# Patient Record
Sex: FEMALE | Race: WHITE
[De-identification: names, ages, dates, MRNs, and addresses within clinical notes are randomized per-mention and may not be internally consistent; named-entity substitution may affect disease eponyms.]

---

## 2017-11-27 ENCOUNTER — HOSPITAL ENCOUNTER (OUTPATIENT)
Dept: HOSPITAL 58 - SURG | Age: 67
Discharge: HOME | End: 2017-11-27
Attending: INTERNAL MEDICINE

## 2017-11-27 VITALS — DIASTOLIC BLOOD PRESSURE: 83 MMHG | SYSTOLIC BLOOD PRESSURE: 176 MMHG | TEMPERATURE: 97.5 F

## 2017-11-27 DIAGNOSIS — Z86.010: ICD-10-CM

## 2017-11-27 DIAGNOSIS — D12.3: ICD-10-CM

## 2017-11-27 DIAGNOSIS — D12.5: ICD-10-CM

## 2017-11-27 DIAGNOSIS — K57.30: ICD-10-CM

## 2017-11-27 DIAGNOSIS — Z98.0: ICD-10-CM

## 2017-11-27 DIAGNOSIS — Z09: Primary | ICD-10-CM

## 2017-11-28 NOTE — OP
PROCEDURE:  COLONOSCOPY TO CECUM WITH SNARE POLYPECTOMY.



ENDOSCOPIST:  RENY TOWNSEND M.D. 



INDICATION:  HISTORY OF POLYPS



INSTRUMENT:  P[employer]ant Power-190.



MEDICATION:  PER ANESTHESIA.



PROCEDURE:  The patient was positioned for colonoscopy.  The digital rectal 
exam was negative.  The colonoscope was inserted through the anus and advanced 
to the cecum. 



Anastomosis noted in the left abdomen proximally 40cm from the anal rectal 
junction. 



On withdraw the hepatic flexure we found a 1.5cm sessile polyp removed using 
piecemeal polypectomy with cautery. This has a hyperplastic appearance. 



A similar sessile polyp at 40cm removed using snare cautery. 



A small adenomatous appearing polyp at 20cm removed using snare cautery. 



Diverticuli were noted throughout the left colon. The retroflex exam was 
otherwise normal. Withdraw 16 minutes and 9 seconds. 



PLAN:  

1. Given the piecemeal resections and history of multiple large polyps suggest 
repeat colonoscopy in one year. 



CC: Dr. Michael SCHNEIDER

## 2018-10-10 ENCOUNTER — HOSPITAL ENCOUNTER (OUTPATIENT)
Dept: HOSPITAL 58 - RAD | Age: 68
Discharge: HOME | End: 2018-10-10
Attending: INTERNAL MEDICINE
Payer: COMMERCIAL

## 2018-10-10 DIAGNOSIS — Z12.31: Primary | ICD-10-CM

## 2018-10-10 PROCEDURE — 77067 SCR MAMMO BI INCL CAD: CPT

## 2018-10-12 NOTE — MAMMO
EXAM:  Bilateral digital screening mammogram (2-D and 3-D) 

  

History:  Screening 

  

Comparison:  Bilateral mammogram 10/09/2015 

  

Findings:  MLO and CC views of bilateral breasts demonstrate scattered fibroglandular breast parenchy
ma.  CAD was reviewed by the radiologist.  Tomosynthesis was performed.  Surgical clips seen within t
he right axilla.  Stable benign bilateral breast calcifications.  There are no dominant masses, no bolanos
spicious microcalcifications and no architectural distortions 

  

Impression:  Benign stable mammogram.  Recommend followup routine screening mammography in 1 year. 

  

BIRADS 2

## 2019-03-01 ENCOUNTER — HOSPITAL ENCOUNTER (OUTPATIENT)
Dept: HOSPITAL 58 - RAD | Age: 69
Discharge: HOME | End: 2019-03-01
Attending: INTERNAL MEDICINE
Payer: COMMERCIAL

## 2019-03-01 DIAGNOSIS — R00.2: Primary | ICD-10-CM

## 2019-03-01 DIAGNOSIS — R42: ICD-10-CM

## 2019-03-01 DIAGNOSIS — I65.29: ICD-10-CM

## 2019-03-01 PROCEDURE — 93227 XTRNL ECG REC<48 HR R&I: CPT

## 2019-03-01 NOTE — US
EXAM:  Bilateral carotid artery Doppler 

  

History:  Dizziness. 

  

Comparison:  Carotid Doppler 06/09/2016 

  

Technique:  Multiple sonographic images through the bilateral internal carotid arteries were obtained
.  Color duplex Doppler was used to interrogate vascular flow. 

  

Findings: 

  

The right ICA peak systolic velocity is within normal limits measuring 83 cm/sec.  The right ICA/cca 
PSV ratio is normal at 1.2.  The right vertebral artery is patent and demonstrates antegrade flow.  G
ray scale images demonstrate mild plaque buildup within the right internal carotid artery. 

  

The left ICA peak systolic velocity is within normal limits measuring 98 cm/sec.  The left ICA/cca PS
V ratio is normal at 1.6.  The left vertebral artery is patent and demonstrates antegrade flow.  Gray
 scale images demonstrate mild plaque buildup within the left internal carotid artery 

  

Impression:  No significant hemodynamic stenosis of the bilateral internal carotid arteries

## 2019-03-01 NOTE — DI
Exam:  Two views of the chest. 

  

Comparison:  06/09/2016. 

  

Reason for exam:  Palpitation. 

  

  

FINDINGS:  No pneumothorax, pleural effusion, or focal consolidation.  Operative changes are seen in 
the right axilla.  The cardiac silhouette is not enlarged. 

  

Impression: 

  

No acute cardiopulmonary process.

## 2019-03-04 NOTE — HOLTER
PATIENT INFORMATION AND COMMENTS

Attending Physician: DR. RODOLFO RINCON



Indications:  PALPITATIONS AND DIZZINESS



________________________________________________________________________________
__

Patient Medications:  GABAPENTIN, ULTRAM, SIMVASTATIN, CYMBALTA



________________________________________________________________________________
__

Pre-procedure Summary: 



Protocol:  Standard      Heart Rate         

Started: 3/1/19 0853      Minimum: 61 BPM   Weight: 169 LBS   

Ended: 3/2/19 0853      Maximum: 120 BPM   Height: 61"

Duration: 24 HOURS      Average:  80 BPM

________________________________________________________________________________
_

INTERPRETATIONS/OBSERVATIONS:

1. BASIC RHYTHM: SINUS, RATE 60 BPM  BPM, AVERAGE 80 BPM

2. FREQUENT PVC'S--ISOLATED, UNIFOCAL 3% TO 4% OF  BEATS SCANNED, NO 
TACHYARRHYTHMIAS

3. NO ST-T WAVE CHANGES FROM BASELINE

4. NO CORRELATION WITH ACTIVITY LOG







MTDD